# Patient Record
Sex: FEMALE | Race: WHITE
[De-identification: names, ages, dates, MRNs, and addresses within clinical notes are randomized per-mention and may not be internally consistent; named-entity substitution may affect disease eponyms.]

---

## 2019-03-20 ENCOUNTER — HOSPITAL ENCOUNTER (EMERGENCY)
Dept: HOSPITAL 74 - FER | Age: 54
Discharge: HOME | End: 2019-03-20
Payer: COMMERCIAL

## 2019-03-20 VITALS — SYSTOLIC BLOOD PRESSURE: 118 MMHG | TEMPERATURE: 98.8 F | DIASTOLIC BLOOD PRESSURE: 83 MMHG | HEART RATE: 80 BPM

## 2019-03-20 VITALS — BODY MASS INDEX: 25 KG/M2
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
## 2019-03-20 DIAGNOSIS — Z04.3: Primary | ICD-10-CM
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
## 2019-03-20 NOTE — PDOC
Attending Attestation





- Resident


Resident Name: Vidal Bailey





- ED Attending Attestation


I have performed the following: I have examined & evaluated the patient, The 

case was reviewed & discussed with the resident, I agree w/resident's findings 

& plan, Exceptions are as noted





- HPI


HPI: 





03/20/19 17:22


53 F presenting to ED after tripping and falling. Pt was walking to her car 

when she tripped, landing on her L shoulder. Denies headstrike/LOC. Also 

reports twisting her R ankle during the fall. Denies any preceding 

lightheadedness/CP/SOB. Pt now with pain in R ankle, though still able to bear 

weight. Also pain in L shoulder with limited ROM.





- Physicial Exam


PE: 





03/20/19 17:36


GENERAL: Awake, alert, and fully oriented, in no acute distress.


HEAD: No signs of trauma


EYES: PERRLA, EOMI, sclera anicteric, conjunctiva clear


ENT: Auricles normal inspection, hearing grossly normal, nares patent, 

oropharynx clear without exudates. Moist mucosa


NECK: Nontender, no stepoffs, Normal ROM, supple, no lymphadenopathy, JVD, or 

masses


LUNGS: Breath sounds equal, clear to auscultation bilaterally.  No wheezes, and 

no crackles


HEART: Regular rate and rhythm, normal S1 and S2, no murmurs, rubs or gallops


ABDOMEN: Soft, nontender, normoactive bowel sounds.  No guarding, no rebound.  

No masses


EXTREMITIES: + L shoulder with TTP over anterior glenoid, normal passive ROM, 

active ROM limited 2/2 pain, R ankle with mild tenderness at lateral malleolus, 

normal ROM, no joint effusion


NEUROLOGICAL: Cranial nerves II through XII intact. 5/5 strength and sensation 

in all extremities, Normal speech, normal gait, normal cerebellar function


SKIN: Warm, Dry, normal turgor, no rashes or lesions noted.





- Medical Decision Making





03/20/19 17:37


53 F with fall today, now with R ankle and L shoulder pain. Likely sprain, 

possible rotator cuff injury.


- XR R ankle and L shoulder negative for fx/dislocation





Pt ambulatory in ED with minimal pain


Pt given ACE wrap and sling, instructed to f/u with orthopedics


Pt is well appearing, with normal vitals. Clinically stable for DC at this time.


I discussed the physical exam findings, ancillary test results and final 

diagnoses with the patient. I answered all of the patient's questions. The 

patient was satisfied with the care received and felt comfortable with the 

discharge plan and treatment plan.  The patient agrees to follow up with the 

primary care physician within 24-72 hours.

## 2019-03-20 NOTE — PDOC
History of Present Illness





- General


Chief Complaint: Injury


Stated Complaint: LEFT ARM RT FOOT PAIN


Time Seen by Provider: 03/20/19 16:08


History Source: Patient


Exam Limitations: No Limitations





- History of Present Illness


Initial Comments: 





03/20/19 16:31


52 yo F with no past medical history presents to the emergency department after 

a fall that occurred today at approximately 1pm. Per the patient, she states 

that she was walking in between a car and sidewalk when she slipped and landed 

on the ground. Per the patient, she immediately began having right ankle pain 

and left shoulder pain. Denies head trauma and LOC. Fall was unwitnessed. 


03/20/19 16:54








Past History





- Past Medical History


Allergies/Adverse Reactions: 


 Allergies











Allergy/AdvReac Type Severity Reaction Status Date / Time


 


sulfur [From Sulfur-8] Allergy   Verified 04/25/16 19:27











Home Medications: 


Ambulatory Orders





Nitrofurantoin Monohyd/M-Cryst [Macrobid -] 100 mg PO BID #14 capsule 04/25/16 











- Suicide/Smoking/Psychosocial Hx


Smoking Status: No


Smoking History: Never smoked


Have you smoked in the past 12 months: No


Number of Cigarettes Smoked Daily: 0


Hx Alcohol Use: No


Drug/Substance Use Hx: No


Substance Use Type: None





*DC/Admit/Observation/Transfer


Diagnosis at time of Disposition: 


Fall


Qualifiers:


 Encounter type: initial encounter Qualified Code(s): W19.XXXA - Unspecified 

fall, initial encounter








- Discharge Dispostion


Disposition: HOME


Decision to Admit order: No





- Referrals


Referrals: 


Gael Coughlin MD [Staff Physician] - 


[facility] [Provider Group]





- Patient Instructions


Printed Discharge Instructions:  How to Prevent Falls


Additional Instructions: 


you were seen in the emergency department for the evaluation of your ankle and 

shoulder after your fall. there was no fracture or dislocation noted on your 

xrays. please take motrin and tylenol as directed on the label. please follow 

up with your primary medical doctor or the one referred to you 1 week after 

discharge for follow up care and management. please follow up with the 

orthopedics doctor in 1 week after discharge if you continue to have pain or 

difficulty moving your should and ankle. please return to the emergency 

department if you have worsening pain or new concerning symptosm such as 

confusion, focal weakness, disorientation, headaches, nausea with 

uncontrollable vomiting, or if your foot or arm have worsening numbness or 

discoloration. thank you. 





- Post Discharge Activity

## 2022-07-09 ENCOUNTER — HOSPITAL ENCOUNTER (EMERGENCY)
Dept: HOSPITAL 74 - FER | Age: 57
Discharge: HOME | End: 2022-07-09
Payer: COMMERCIAL

## 2022-07-09 VITALS — HEART RATE: 81 BPM | SYSTOLIC BLOOD PRESSURE: 123 MMHG | DIASTOLIC BLOOD PRESSURE: 81 MMHG | TEMPERATURE: 97.8 F

## 2022-07-09 VITALS — BODY MASS INDEX: 25 KG/M2

## 2022-07-09 DIAGNOSIS — S52.531A: Primary | ICD-10-CM

## 2022-07-09 DIAGNOSIS — S52.614A: ICD-10-CM

## 2022-07-09 PROCEDURE — U0005 INFEC AGEN DETEC AMPLI PROBE: HCPCS

## 2022-07-09 PROCEDURE — U0003 INFECTIOUS AGENT DETECTION BY NUCLEIC ACID (DNA OR RNA); SEVERE ACUTE RESPIRATORY SYNDROME CORONAVIRUS 2 (SARS-COV-2) (CORONAVIRUS DISEASE [COVID-19]), AMPLIFIED PROBE TECHNIQUE, MAKING USE OF HIGH THROUGHPUT TECHNOLOGIES AS DESCRIBED BY CMS-2020-01-R: HCPCS

## 2023-05-12 ENCOUNTER — NON-APPOINTMENT (OUTPATIENT)
Age: 58
End: 2023-05-12

## 2023-06-06 ENCOUNTER — NON-APPOINTMENT (OUTPATIENT)
Age: 58
End: 2023-06-06

## 2023-06-15 ENCOUNTER — APPOINTMENT (OUTPATIENT)
Dept: OPHTHALMOLOGY | Facility: CLINIC | Age: 58
End: 2023-06-15
Payer: COMMERCIAL

## 2023-06-15 ENCOUNTER — NON-APPOINTMENT (OUTPATIENT)
Age: 58
End: 2023-06-15

## 2023-06-15 PROCEDURE — 92136 OPHTHALMIC BIOMETRY: CPT

## 2023-06-15 PROCEDURE — 92014 COMPRE OPH EXAM EST PT 1/>: CPT

## 2023-06-15 PROCEDURE — 92025 CPTRIZED CORNEAL TOPOGRAPHY: CPT

## 2023-06-16 PROBLEM — Z00.00 ENCOUNTER FOR PREVENTIVE HEALTH EXAMINATION: Status: ACTIVE | Noted: 2023-06-16

## 2023-06-22 ENCOUNTER — NON-APPOINTMENT (OUTPATIENT)
Age: 58
End: 2023-06-22

## 2023-06-22 ENCOUNTER — APPOINTMENT (OUTPATIENT)
Dept: OPHTHALMOLOGY | Facility: CLINIC | Age: 58
End: 2023-06-22

## 2023-06-22 ENCOUNTER — OUTPATIENT (OUTPATIENT)
Dept: OUTPATIENT SERVICES | Facility: HOSPITAL | Age: 58
LOS: 1 days | End: 2023-06-22
Payer: SELF-PAY

## 2023-06-22 PROCEDURE — S0810: CPT | Mod: RT

## 2023-06-23 ENCOUNTER — APPOINTMENT (OUTPATIENT)
Dept: OPHTHALMOLOGY | Facility: CLINIC | Age: 58
End: 2023-06-23
Payer: COMMERCIAL

## 2023-06-23 ENCOUNTER — NON-APPOINTMENT (OUTPATIENT)
Age: 58
End: 2023-06-23

## 2023-06-23 DIAGNOSIS — H52.03 HYPERMETROPIA, BILATERAL: ICD-10-CM

## 2023-06-23 PROCEDURE — 99024 POSTOP FOLLOW-UP VISIT: CPT

## 2023-06-27 ENCOUNTER — APPOINTMENT (OUTPATIENT)
Dept: OPHTHALMOLOGY | Facility: CLINIC | Age: 58
End: 2023-06-27
Payer: COMMERCIAL

## 2023-06-27 ENCOUNTER — NON-APPOINTMENT (OUTPATIENT)
Age: 58
End: 2023-06-27

## 2023-06-27 PROCEDURE — 99024 POSTOP FOLLOW-UP VISIT: CPT

## 2023-06-29 ENCOUNTER — APPOINTMENT (OUTPATIENT)
Dept: OPHTHALMOLOGY | Facility: CLINIC | Age: 58
End: 2023-06-29

## 2023-08-08 ENCOUNTER — NON-APPOINTMENT (OUTPATIENT)
Age: 58
End: 2023-08-08

## 2023-08-08 ENCOUNTER — APPOINTMENT (OUTPATIENT)
Dept: OPHTHALMOLOGY | Facility: CLINIC | Age: 58
End: 2023-08-08
Payer: COMMERCIAL

## 2023-08-08 PROCEDURE — 99024 POSTOP FOLLOW-UP VISIT: CPT

## 2023-08-29 ENCOUNTER — APPOINTMENT (OUTPATIENT)
Dept: OPHTHALMOLOGY | Facility: CLINIC | Age: 58
End: 2023-08-29
Payer: COMMERCIAL

## 2023-08-29 ENCOUNTER — NON-APPOINTMENT (OUTPATIENT)
Age: 58
End: 2023-08-29

## 2023-08-29 PROCEDURE — 99024 POSTOP FOLLOW-UP VISIT: CPT

## 2023-09-15 ENCOUNTER — NON-APPOINTMENT (OUTPATIENT)
Age: 58
End: 2023-09-15

## 2023-09-15 ENCOUNTER — APPOINTMENT (OUTPATIENT)
Dept: OPHTHALMOLOGY | Facility: CLINIC | Age: 58
End: 2023-09-15
Payer: COMMERCIAL

## 2023-09-15 PROCEDURE — 66821 AFTER CATARACT LASER SURGERY: CPT | Mod: RT

## 2023-09-29 ENCOUNTER — APPOINTMENT (OUTPATIENT)
Dept: OPHTHALMOLOGY | Facility: CLINIC | Age: 58
End: 2023-09-29

## 2023-10-05 ENCOUNTER — APPOINTMENT (OUTPATIENT)
Dept: PLASTIC SURGERY | Facility: CLINIC | Age: 58
End: 2023-10-05

## 2023-11-16 ENCOUNTER — APPOINTMENT (OUTPATIENT)
Dept: PLASTIC SURGERY | Facility: CLINIC | Age: 58
End: 2023-11-16

## 2025-03-04 ENCOUNTER — APPOINTMENT (OUTPATIENT)
Dept: OPHTHALMOLOGY | Facility: CLINIC | Age: 60
End: 2025-03-04